# Patient Record
Sex: MALE | Race: WHITE | NOT HISPANIC OR LATINO | URBAN - NONMETROPOLITAN AREA
[De-identification: names, ages, dates, MRNs, and addresses within clinical notes are randomized per-mention and may not be internally consistent; named-entity substitution may affect disease eponyms.]

---

## 2019-01-03 ENCOUNTER — APPOINTMENT (OUTPATIENT)
Dept: LAB | Facility: HOSPITAL | Age: 64
End: 2019-01-03

## 2019-01-03 ENCOUNTER — TRANSCRIBE ORDERS (OUTPATIENT)
Dept: ADMINISTRATIVE | Facility: HOSPITAL | Age: 64
End: 2019-01-03

## 2019-01-03 ENCOUNTER — HOSPITAL ENCOUNTER (OUTPATIENT)
Dept: GENERAL RADIOLOGY | Facility: HOSPITAL | Age: 64
Discharge: HOME | End: 2019-01-03
Admitting: FAMILY MEDICINE

## 2019-01-03 DIAGNOSIS — R07.9 CHEST PAIN, UNSPECIFIED TYPE: Primary | ICD-10-CM

## 2019-01-03 LAB
CK MB SERPL-CCNC: 0.61 NG/ML (ref 0.2–2.4)
CK SERPL-CCNC: 26 U/L (ref 30–170)
TROPONIN I SERPL-MCNC: <0.012 NG/ML (ref 0–0.03)

## 2019-01-03 PROCEDURE — 71046 X-RAY EXAM CHEST 2 VIEWS: CPT

## 2019-01-03 PROCEDURE — 82550 ASSAY OF CK (CPK): CPT | Performed by: FAMILY MEDICINE

## 2019-01-03 PROCEDURE — 36415 COLL VENOUS BLD VENIPUNCTURE: CPT | Performed by: FAMILY MEDICINE

## 2019-01-03 PROCEDURE — 82553 CREATINE MB FRACTION: CPT | Performed by: FAMILY MEDICINE

## 2019-01-03 PROCEDURE — 84484 ASSAY OF TROPONIN QUANT: CPT | Performed by: FAMILY MEDICINE

## 2019-01-07 ENCOUNTER — APPOINTMENT (OUTPATIENT)
Dept: NUCLEAR MEDICINE | Facility: HOSPITAL | Age: 64
End: 2019-01-07

## 2019-04-16 ENCOUNTER — OFFICE VISIT (OUTPATIENT)
Dept: GASTROENTEROLOGY | Facility: CLINIC | Age: 64
End: 2019-04-16

## 2019-04-16 VITALS
HEART RATE: 62 BPM | HEIGHT: 70 IN | WEIGHT: 252 LBS | RESPIRATION RATE: 16 BRPM | BODY MASS INDEX: 36.08 KG/M2 | TEMPERATURE: 97.4 F | DIASTOLIC BLOOD PRESSURE: 96 MMHG | SYSTOLIC BLOOD PRESSURE: 145 MMHG

## 2019-04-16 DIAGNOSIS — Z12.11 COLON CANCER SCREENING: Primary | ICD-10-CM

## 2019-04-16 PROCEDURE — S0285 CNSLT BEFORE SCREEN COLONOSC: HCPCS | Performed by: NURSE PRACTITIONER

## 2019-04-16 RX ORDER — LEVOTHYROXINE SODIUM 0.15 MG/1
150 TABLET ORAL DAILY
COMMUNITY
End: 2021-08-13

## 2019-04-16 RX ORDER — SODIUM CHLORIDE 9 MG/ML
70 INJECTION, SOLUTION INTRAVENOUS CONTINUOUS PRN
Status: CANCELLED | OUTPATIENT
Start: 2019-05-14

## 2019-04-16 NOTE — PROGRESS NOTES
"Chief Complaint   Patient presents with   • Colon Cancer Screening     History of Present Illness     The patient denies recent change in bowel habits. There is no diarrhea or constipation. There is no history of abdominal pain. There is no history of overt GI bleed (hematemesis melena or hematochezia). The patient denies nausea or vomiting. There is no history of reflux. The patient denies dysphagia or odynophagia. There is no history of recent significant weight loss. There is no history of liver disease in the past. There is no family history of colon cancer. The patient's last colonoscopy was in 2008 and was \"normal\" per patient.    Review of Systems   Constitutional: Negative for appetite change, chills, fatigue, fever and unexpected weight change.   HENT: Negative for mouth sores, nosebleeds and trouble swallowing.    Eyes: Negative for discharge and redness.   Respiratory: Positive for shortness of breath. Negative for apnea and cough.    Cardiovascular: Negative for chest pain, palpitations and leg swelling.   Gastrointestinal: Negative for abdominal distention, abdominal pain, anal bleeding, blood in stool, constipation, diarrhea, nausea and vomiting.   Endocrine: Negative for cold intolerance, heat intolerance and polydipsia.   Genitourinary: Negative for dysuria, hematuria and urgency.   Musculoskeletal: Negative for arthralgias, joint swelling and myalgias.   Skin: Negative for rash.   Allergic/Immunologic: Negative for food allergies and immunocompromised state.   Neurological: Negative for dizziness, seizures, syncope and headaches.   Hematological: Negative for adenopathy. Does not bruise/bleed easily.   Psychiatric/Behavioral: Negative for dysphoric mood. The patient is not nervous/anxious and is not hyperactive.      There is no problem list on file for this patient.    Past Medical History:   Diagnosis Date   • [place]      Past Surgical History:   Procedure Laterality Date   • COLONOSCOPY  2008   • " "HAND SURGERY      due to fracture, pins placed, and later removed   • SKIN LESION EXCISION      face     Family History   Problem Relation Age of Onset   • Liver cancer Brother    • Liver disease Brother    • Colon cancer Neg Hx    • Cirrhosis Neg Hx    • Crohn's disease Neg Hx    • Ulcerative colitis Neg Hx    • Esophageal cancer Neg Hx    • Stomach cancer Neg Hx      Social History     Tobacco Use   • Smoking status: Never Smoker   • Smokeless tobacco: Never Used   Substance Use Topics   • Alcohol use: No     Frequency: Never       Current Outpatient Medications:   •  ASPIRIN 81 PO, Take 1 tablet by mouth Daily., Disp: , Rfl:   •  levothyroxine (LEVOXYL) 150 MCG tablet, Take 150 mcg by mouth Daily., Disp: , Rfl:   •  Multiple Vitamin (MULTI VITAMIN PO), Take 1 tablet by mouth Daily., Disp: , Rfl:   •  Omega-3 Fatty Acids (FISH OIL PO), Take 1 capsule by mouth Daily., Disp: , Rfl:     No Known Allergies    Blood pressure 145/96, pulse 62, temperature 97.4 °F (36.3 °C), resp. rate 16, height 177.8 cm (70\"), weight 114 kg (252 lb).    Physical Exam   Constitutional: He is oriented to person, place, and time. He appears well-developed and well-nourished. No distress.   HENT:   Head: Normocephalic and atraumatic.   Right Ear: Hearing and external ear normal.   Left Ear: Hearing and external ear normal.   Nose: Nose normal.   Mouth/Throat: Oropharynx is clear and moist and mucous membranes are normal. Mucous membranes are not pale, not dry and not cyanotic. No oral lesions. No oropharyngeal exudate.   Eyes: Conjunctivae and EOM are normal. Right eye exhibits no discharge. Left eye exhibits no discharge.   Neck: Trachea normal. Neck supple. No JVD present. No edema present. No thyroid mass and no thyromegaly present.   Cardiovascular: Normal rate, regular rhythm, S2 normal and normal heart sounds. Exam reveals no gallop, no S3 and no friction rub.   No murmur heard.  Pulmonary/Chest: Effort normal and breath sounds " normal. No respiratory distress. He exhibits no tenderness.   Abdominal: Normal appearance and bowel sounds are normal. He exhibits no distension, no ascites and no mass. There is no splenomegaly or hepatomegaly. There is no tenderness. There is no rigidity, no rebound and no guarding. No hernia.     Vascular Status -  His right foot exhibits no edema. His left foot exhibits no edema.  Lymphadenopathy:     He has no cervical adenopathy.        Left: No supraclavicular adenopathy present.   Neurological: He is alert and oriented to person, place, and time. He has normal strength. No cranial nerve deficit or sensory deficit.   Skin: No rash noted. He is not diaphoretic. No cyanosis. No pallor. Nails show no clubbing.   Psychiatric: He has a normal mood and affect.   Nursing note and vitals reviewed.  Stigmata of chronic liver disease:  None.  Asterixis:  None.    Assessment:      ICD-10-CM ICD-9-CM   1. Colon cancer screening Z12.11 V76.51       Plan/  Patient Instructions   1. Colonoscopy: Description of the procedure, risks, benefits, alternatives and options, including nonoperative options, were discussed with the patient in detail. The patient understands and wishes to proceed.     SHINE Allred

## 2019-05-06 PROBLEM — Z12.11 COLON CANCER SCREENING: Status: ACTIVE | Noted: 2019-05-06

## 2019-05-13 NOTE — PAT
Called anesthesia phone.  Spoke with Heladio Sullivan, CRNA R/T pt C/O chest pain, with the most recent episode in Dec 2018, in which he saw Dr. Toribio for evaluation.  Pt states that he had a exercise stress test in January 2019.  Pt states that he has not had CP since that time, and states pain was not R/T his heart. Permission received to obtain records from Dr. Toribio.  Received records back.  Reviewed stress test results and plan of care (per office note on 1/31/19) with EBER.  Heladio stated nothing further is required prior to procedure with Dr. Feliciano tomorrow.

## 2019-05-14 ENCOUNTER — ANESTHESIA EVENT (OUTPATIENT)
Dept: GASTROENTEROLOGY | Facility: HOSPITAL | Age: 64
End: 2019-05-14

## 2019-05-14 ENCOUNTER — ANESTHESIA (OUTPATIENT)
Dept: GASTROENTEROLOGY | Facility: HOSPITAL | Age: 64
End: 2019-05-14

## 2019-05-14 ENCOUNTER — HOSPITAL ENCOUNTER (OUTPATIENT)
Facility: HOSPITAL | Age: 64
Setting detail: HOSPITAL OUTPATIENT SURGERY
Discharge: HOME | End: 2019-05-14
Attending: INTERNAL MEDICINE | Admitting: INTERNAL MEDICINE

## 2019-05-14 VITALS
RESPIRATION RATE: 18 BRPM | SYSTOLIC BLOOD PRESSURE: 126 MMHG | BODY MASS INDEX: 36.08 KG/M2 | HEIGHT: 70 IN | TEMPERATURE: 98.3 F | WEIGHT: 252 LBS | DIASTOLIC BLOOD PRESSURE: 88 MMHG | HEART RATE: 66 BPM | OXYGEN SATURATION: 94 %

## 2019-05-14 DIAGNOSIS — Z12.11 COLON CANCER SCREENING: ICD-10-CM

## 2019-05-14 PROCEDURE — 45380 COLONOSCOPY AND BIOPSY: CPT | Performed by: INTERNAL MEDICINE

## 2019-05-14 PROCEDURE — 25010000002 PROPOFOL 200 MG/20ML EMULSION: Performed by: NURSE ANESTHETIST, CERTIFIED REGISTERED

## 2019-05-14 PROCEDURE — 45385 COLONOSCOPY W/LESION REMOVAL: CPT | Performed by: INTERNAL MEDICINE

## 2019-05-14 DEVICE — CLIPPING DEVICE
Type: IMPLANTABLE DEVICE | Site: ABDOMEN | Status: FUNCTIONAL
Brand: RESOLUTION CLIP

## 2019-05-14 RX ORDER — PROPOFOL 10 MG/ML
INJECTION, EMULSION INTRAVENOUS AS NEEDED
Status: DISCONTINUED | OUTPATIENT
Start: 2019-05-14 | End: 2019-05-14 | Stop reason: SURG

## 2019-05-14 RX ORDER — MAGNESIUM HYDROXIDE 1200 MG/15ML
LIQUID ORAL AS NEEDED
Status: DISCONTINUED | OUTPATIENT
Start: 2019-05-14 | End: 2019-05-14 | Stop reason: HOSPADM

## 2019-05-14 RX ORDER — LIDOCAINE 50 MG/G
OINTMENT TOPICAL AS NEEDED
Status: DISCONTINUED | OUTPATIENT
Start: 2019-05-14 | End: 2019-05-14 | Stop reason: HOSPADM

## 2019-05-14 RX ORDER — KETAMINE HYDROCHLORIDE 50 MG/ML
INJECTION, SOLUTION, CONCENTRATE INTRAMUSCULAR; INTRAVENOUS AS NEEDED
Status: DISCONTINUED | OUTPATIENT
Start: 2019-05-14 | End: 2019-05-14 | Stop reason: SURG

## 2019-05-14 RX ORDER — SODIUM CHLORIDE 0.9 % (FLUSH) 0.9 %
3 SYRINGE (ML) INJECTION AS NEEDED
Status: DISCONTINUED | OUTPATIENT
Start: 2019-05-14 | End: 2019-05-14 | Stop reason: HOSPADM

## 2019-05-14 RX ORDER — SODIUM CHLORIDE 9 MG/ML
70 INJECTION, SOLUTION INTRAVENOUS CONTINUOUS PRN
Status: DISCONTINUED | OUTPATIENT
Start: 2019-05-14 | End: 2019-05-14 | Stop reason: HOSPADM

## 2019-05-14 RX ORDER — SIMETHICONE 20 MG/.3ML
EMULSION ORAL AS NEEDED
Status: DISCONTINUED | OUTPATIENT
Start: 2019-05-14 | End: 2019-05-14 | Stop reason: HOSPADM

## 2019-05-14 RX ADMIN — PROPOFOL 100 MG: 10 INJECTION, EMULSION INTRAVENOUS at 10:23

## 2019-05-14 RX ADMIN — SODIUM CHLORIDE: 9 INJECTION, SOLUTION INTRAVENOUS at 10:02

## 2019-05-14 RX ADMIN — PROPOFOL 100 MG: 10 INJECTION, EMULSION INTRAVENOUS at 10:37

## 2019-05-14 RX ADMIN — KETAMINE HYDROCHLORIDE 25 MG: 50 INJECTION, SOLUTION INTRAMUSCULAR; INTRAVENOUS at 10:14

## 2019-05-14 RX ADMIN — PROPOFOL 100 MG: 10 INJECTION, EMULSION INTRAVENOUS at 10:14

## 2019-05-14 RX ADMIN — SODIUM CHLORIDE 70 ML/HR: 9 INJECTION, SOLUTION INTRAVENOUS at 07:54

## 2019-05-14 NOTE — ANESTHESIA PREPROCEDURE EVALUATION
Anesthesia Evaluation     Patient summary reviewed and Nursing notes reviewed   NPO Solid Status: > 8 hours  NPO Liquid Status: > 8 hours           Airway   Mallampati: I  TM distance: >3 FB  Neck ROM: full  No difficulty expected  Dental - normal exam   (+) poor dentition    Pulmonary - negative pulmonary ROS and normal exam   Cardiovascular - normal exam    (+) hyperlipidemia,       Neuro/Psych- negative ROS  GI/Hepatic/Renal/Endo    (+) obesity,       Musculoskeletal (-) negative ROS    Abdominal  - normal exam    Bowel sounds: normal.   Substance History - negative use     OB/GYN negative ob/gyn ROS         Other                        Anesthesia Plan    ASA 2     MAC     intravenous induction   Anesthetic plan, all risks, benefits, and alternatives have been provided, discussed and informed consent has been obtained with: patient.    Plan discussed with attending.

## 2019-05-14 NOTE — ANESTHESIA POSTPROCEDURE EVALUATION
Patient: Chucho Castro    Procedure Summary     Date:  05/14/19 Room / Location:  [facility] ENDOSCOPY 2 / [facility] ENDOSCOPY    Anesthesia Start:  1008 Anesthesia Stop:  1048    Procedure:  COLONOSCOPY with hot snare polypectomy x 1, resolution clip placement x1 (N/A Anus) Diagnosis:       Colon cancer screening      (Colon cancer screening [Z12.11])    Surgeon:  Alessandro Feliciano MD Provider:  Joshua Waddell CRNA    Anesthesia Type:  MAC ASA Status:  2          Anesthesia Type: MAC  Last vitals  BP   126/88 (05/14/19 1150)   Temp   98.3 °F (36.8 °C) (05/14/19 1056)   Pulse   66 (05/14/19 1150)   Resp   18 (05/14/19 1150)     SpO2   94 % (05/14/19 1150)     Post Anesthesia Care and Evaluation    Patient location during evaluation: bedside  Patient participation: complete - patient participated  Level of consciousness: awake and alert  Pain score: 0  Pain management: satisfactory to patient  Airway patency: patent  Anesthetic complications: No anesthetic complications  PONV Status: none  Cardiovascular status: acceptable and hemodynamically stable  Respiratory status: acceptable  Hydration status: acceptable

## 2019-05-14 NOTE — INTERVAL H&P NOTE
"  H&P reviewed. The patient was examined and there are no changes to the H&P.       No recent shortness of breath or chest pains.      Blood pressure 145/82, pulse 88, temperature 97.8 °F (36.6 °C), temperature source Temporal, resp. rate 16, height 177.8 cm (70\"), weight 114 kg (252 lb), SpO2 93 %.    Chest: clear to auscultation.  Cardiac exam: No S3 no murmurs.   Abdomen: soft bowel sounds present nondistended nontender.        "

## 2019-05-14 NOTE — OP NOTE
PROCEDURE:  Colonoscopy to the terminal ileum with one hot snare and 2 cold biopsy polypectomies as well as placement of a resolution clip to coapt the margins.    DATE OF PROCEDURE:  May 14, 2019    REFERRING PROVIDER:  Tamera Joy MD     INSTRUMENT USED:  Olympus PCF H180 AL videocolonoscope.       INDICATIONS OF THE PROCEDURE: This is a 64-year-old white male for colon cancer screening.    PREVIOUS COLONOSCOPY: 2008.    BIOPSIES: Hot snare polypectomy was performed in the proximal descending colon.  A cold biopsy polypectomy was performed in the ascending colon and one in the rectum.      PHOTOGRAPHS:  Photographs were included in the medical records.     MEDICATIONS:  MAC.       CONSENT/PREPROCEDURE EVALUATION:  Risks, benefits, alternatives and options of the procedure including risks of sedation/anesthesia were discussed with the patient and informed consent was obtained prior to the procedure.  History and physical examination were performed and nothing precluded the test.      REPORT:  The patient was placed in left lateral decubitus position and a digital examination was performed.  Once under the influence of IV sedation, the instrument was inserted into the rectum and advanced under direct vision to cecum which was identified by the ileocecal valve, triradiate folds and appendiceal orifice. The scope was then maneuvered into the terminal ileum.        FINDINGS:      Digital rectal examination:  Good anal tone.  No perianal pathology.  No mass.        Terminal ileum:  7-8 cm.  Normal.     Cecum and ascending colon: Scant early diverticular change was noted in the right colon.  3 mm sessile polyp in the ascending colon was removed with cold biopsy forceps.     Hepatic flexure, transverse colon, splenic flexure:  Scant diverticulosis.     Descending colon, sigmoid colon and rectum: Diverticulosis.  An 8 mm flat sessile polyp in the proximal descending colon was removed with hot snare.  Margins were  coapted using a resolution clip.  A 3 mm sessile polyp in the rectum was removed with cold biopsy forceps.  A retroflex examination within the rectum revealed internal hemorrhoids.        The scope was then straightened, the lower GI tract was decompressed, and the scope was pulled out of the patient.  The patient tolerated the procedure well.  There were no immediate complications and the patient was transferred in stable condition for post procedure observation.      TECHNICAL DATA:   1. [place] prep score: 8 (3+2+3).    2. Anus to cecal time: 5  minutes.  3. Difficulty of examination:  Average.  The colon was noted to be tortuous and redundant.  4. Withdrawal time: 18 minutes.  5. Procedure time: 30  minutes  6. Retroflex examination in right colon: Yes.    7. Second look Rectum to cecum with decompression: Yes.    DIAGNOSES:    1. Pandiverticulosis more pronounced in the left colon as compared to the transverse colon no right colon.  2. Colon polyps.  3 were removed.  3-8 mm in size.  3. Internal hemorrhoids.    RECOMMENDATIONS:     1. Dietary instructions.  2. Follow biopsies.    3. Follow-up: The patient may call the office in 1 week regarding biopsy results.  However, the patient may follow-up in the office in 4 weeks if desired.  4. Followup colonoscopy:  5 years.          Thank you very much for letting me participate in the care of this patient. Please do not hesitate to call me if you have any questions.

## 2019-05-14 NOTE — DISCHARGE INSTRUCTIONS
Rest today  No pushing,pulling,tugging,heavy lifting, or strenuous activity   No major decision making,driving,or drinking alcoholic beverages for 24 hours due to the sedation you received  Always use good hand hygiene/washing technique  No driving on pain medication.      To assist you in voiding:  Drink plenty of fluids  Listen to running water while attempting to void.    If you are unable to urinate and you have an uncomfortable urge to void or it has been   6 hours since you were discharged, return to the Emergency Room.        ************************************************************************************    Postprocedure instructions:    Nothing by mouth to fully alert.  Once fully alert may have clear liquid diet.  Advance diet as tolerated.  Vital signs as routine.    Diet:     Low-fat diet.    Blood Thinner Directions:    Avoid Aspirin & other NSAIDS for _7__ days. Tylenol is okay.      Other Instructions:    Call [facility] at [phone] or come to the Emergency Department if you experience the following: Chest pain, abdominal pain, bleeding (vomiting of blood or coffee colored material, black stools or jeff blood in stools), fever/chills, nausea and vomiting or dizziness.      Follow-up:  The patient may call the office in 1 week regarding biopsy results.  However, the patient may follow-up in the office in 4 weeks if desired. DR. DARIN FELICIANO. Office phone # [phone].    Follow-up colonoscopy: in 5 years.    ************************************************************************************    Notes to the patient and the family from Dr. Feliciano.    Dear patient/family member,    Today your colon was examined extensively from rectum to cecum and beyond into the small intestine twice.     Findings on today's procedure are as follows:    1. Diverticulosis. These are benign outpouchings in the colon.   2. Colon polyp. 3 were found and removed.   3. No cancer. No inflammation or  colitis. No suggestion of Crohn's disease.  4. Internal hemorrhoids.     Recommendations:    1. As above.    Should you have more questions please do not hesitate to talk to the nurse who can call me and let me talk to you.      I hope you feel better.    Alessandro Feliciano M.D., FACP, FACG.

## 2019-05-18 LAB
LAB AP CASE REPORT: NORMAL
PATH REPORT.FINAL DX SPEC: NORMAL

## 2021-08-13 ENCOUNTER — OFFICE VISIT (OUTPATIENT)
Dept: CARDIOLOGY | Facility: CLINIC | Age: 66
End: 2021-08-13

## 2021-08-13 VITALS
BODY MASS INDEX: 35.65 KG/M2 | WEIGHT: 249 LBS | OXYGEN SATURATION: 94 % | HEIGHT: 70 IN | HEART RATE: 69 BPM | DIASTOLIC BLOOD PRESSURE: 80 MMHG | SYSTOLIC BLOOD PRESSURE: 120 MMHG

## 2021-08-13 DIAGNOSIS — R06.09 DOE (DYSPNEA ON EXERTION): ICD-10-CM

## 2021-08-13 DIAGNOSIS — R07.2 PRECORDIAL PAIN: Primary | ICD-10-CM

## 2021-08-13 PROCEDURE — 93000 ELECTROCARDIOGRAM COMPLETE: CPT | Performed by: INTERNAL MEDICINE

## 2021-08-13 PROCEDURE — 99204 OFFICE O/P NEW MOD 45 MIN: CPT | Performed by: INTERNAL MEDICINE

## 2021-08-13 RX ORDER — LEVOTHYROXINE SODIUM 175 UG/1
175 TABLET ORAL DAILY
COMMUNITY
Start: 2021-07-29

## 2021-08-13 NOTE — PROGRESS NOTES
Subjective:     Encounter Date:08/13/2021      Patient ID: Chucho Castro is a 66 y.o. male.    Chief Complaint: Throat pain  HPI  This is a 66-year-old male patient with no prior history of documented coronary disease who presents to cardiology clinic to evaluate bilateral throat discomfort.  The patient describes a squeezing sensation in his throat which began approximately 1/2 months ago.  The episodes typically occur with exertion and will last approximately 10 to 20 minutes per episode.  It typically has a 5/10 intensity and is associated with nausea shortness of breath and diaphoresis.  The symptoms typically occur when working and doing other exertional activities and tend to be relieved with rest.  The patient has a longstanding history of atypical chest pain.  He underwent a treadmill exercise stress test 2 years ago by Dr. Toribio.  This was reported to be normal.  He has had a previous nuclear stress test which was normal several years ago.  Patient has a strong family history of premature coronary artery disease.  He has no personal history of hypertension diabetes or dyslipidemia.  He is a non-smoker.  The patient indicates that up until recently he was drinking 2 L of diet soda per day.  He indicates he has stopped drinking the soda and his symptoms have improved.  The patient also reports having shortness of breath with exertional activities.  This is typically relieved with rest and will generally last around 10 minutes per episode.  It tends to be associated with his throat tightness but does occasionally occur in isolation.  He has had no orthopnea PND or lower extremity edema.  He has no dizziness palpitations or syncope.  The following portions of the patient's history were reviewed and updated as appropriate: allergies, current medications, past family history, past medical history, past social history, past surgical history and problem  Review of Systems   Constitutional: Negative for  chills, diaphoresis, fever, malaise/fatigue, weight gain and weight loss.   HENT: Negative for ear discharge, hearing loss, hoarse voice and nosebleeds.    Eyes: Negative for discharge, double vision, pain and photophobia.   Cardiovascular: Positive for chest pain and dyspnea on exertion. Negative for claudication, cyanosis, irregular heartbeat, leg swelling, near-syncope, orthopnea, palpitations, paroxysmal nocturnal dyspnea and syncope.   Respiratory: Positive for shortness of breath. Negative for cough, hemoptysis, sputum production and wheezing.    Endocrine: Negative for cold intolerance, heat intolerance, polydipsia, polyphagia and polyuria.   Hematologic/Lymphatic: Negative for adenopathy and bleeding problem. Does not bruise/bleed easily.   Skin: Negative for color change, flushing, itching and rash.   Musculoskeletal: Negative for muscle cramps, muscle weakness, myalgias and stiffness.   Gastrointestinal: Negative for abdominal pain, diarrhea, hematemesis, hematochezia, nausea and vomiting.   Genitourinary: Negative for dysuria, frequency and nocturia.   Neurological: Negative for focal weakness, loss of balance, numbness, paresthesias and seizures.   Psychiatric/Behavioral: Negative for altered mental status, hallucinations and suicidal ideas.   Allergic/Immunologic: Negative for HIV exposure, hives and persistent infections.           Current Outpatient Medications:   •  ASPIRIN 81 PO, Take 1 tablet by mouth Daily., Disp: , Rfl:   •  levothyroxine (SYNTHROID, LEVOTHROID) 175 MCG tablet, Take 175 mcg by mouth Daily., Disp: , Rfl:   •  Multiple Vitamin (MULTI VITAMIN PO), Take 1 tablet by mouth Daily., Disp: , Rfl:   •  Omega-3 Fatty Acids (FISH OIL PO), Take 1 capsule by mouth Daily., Disp: , Rfl:     Objective:   Vitals and nursing note reviewed.   Constitutional:       Appearance: Healthy appearance. Not in distress.   Neck:      Vascular: No JVR. JVD normal.   Pulmonary:      Effort: Pulmonary effort is  "normal.      Breath sounds: Normal breath sounds. No wheezing. No rhonchi. No rales.   Chest:      Chest wall: Not tender to palpatation.   Cardiovascular:      PMI at left midclavicular line. Normal rate. Regular rhythm. Normal S1. Normal S2.      Murmurs: There is no murmur.      No gallop. No click. No rub.   Pulses:     Intact distal pulses.   Edema:     Peripheral edema absent.   Abdominal:      General: Bowel sounds are normal.      Palpations: Abdomen is soft.      Tenderness: There is no abdominal tenderness.   Musculoskeletal: Normal range of motion.         General: No tenderness. Skin:     General: Skin is warm and dry.   Neurological:      General: No focal deficit present.      Mental Status: Alert and oriented to person, place and time.       Blood pressure 120/80, pulse 69, height 177.8 cm (70\"), weight 113 kg (249 lb), SpO2 94 %.   Lab Review:     Assessment:       1. Precordial pain  Atypical chest pain.  Its been 2 years since his last ischemic evaluation.  Multiple risk factors for coronary artery disease.    2. [facility] (dyspnea on exertion)  Possible angina equivalent.      ECG 12 Lead    Date/Time: 8/13/2021 12:12 PM  Performed by: Jeffrey Lopez MD  Authorized by: Jeffrey Lopez MD   Comparison: not compared with previous ECG   Rhythm: sinus rhythm  Rate: normal  QRS axis: normal  Other findings: T wave abnormality    Clinical impression: abnormal EKG            Plan:     I have recommended a treadmill exercise stress test and an echocardiogram.  No changes to his medications have been made at today's visit.  Further recommendations will be predicated on the results of his outpatient testing.  Advance Care Planning   ACP discussion was held with the patient during this visit. Patient has an advance directive (not in EMR), copy requested.        "

## 2021-09-01 LAB
BH CV ECHO MEAS - % IVS THICK: 25 %
BH CV ECHO MEAS - % LVPW THICK: 34.8 %
BH CV ECHO MEAS - AO MAX PG (FULL): -0.4 MMHG
BH CV ECHO MEAS - AO MAX PG: 6 MMHG
BH CV ECHO MEAS - AO MEAN PG (FULL): 1 MMHG
BH CV ECHO MEAS - AO MEAN PG: 3 MMHG
BH CV ECHO MEAS - AO ROOT AREA (BSA CORRECTED): 0.83
BH CV ECHO MEAS - AO ROOT AREA: 2.8 CM^2
BH CV ECHO MEAS - AO ROOT DIAM: 1.9 CM
BH CV ECHO MEAS - AO V2 MAX: 124 CM/SEC
BH CV ECHO MEAS - AO V2 MEAN: 81.6 CM/SEC
BH CV ECHO MEAS - AO V2 VTI: 23.4 CM
BH CV ECHO MEAS - AVA(I,A): 3.5 CM^2
BH CV ECHO MEAS - AVA(I,D): 3.5 CM^2
BH CV ECHO MEAS - AVA(V,A): 3.5 CM^2
BH CV ECHO MEAS - AVA(V,D): 3.5 CM^2
BH CV ECHO MEAS - BSA(HAYCOCK): 2.4 M^2
BH CV ECHO MEAS - BSA: 2.3 M^2
BH CV ECHO MEAS - BZI_BMI: 35.7 KILOGRAMS/M^2
BH CV ECHO MEAS - BZI_METRIC_HEIGHT: 177.8 CM
BH CV ECHO MEAS - BZI_METRIC_WEIGHT: 112.9 KG
BH CV ECHO MEAS - EDV(CUBED): 153.1 ML
BH CV ECHO MEAS - EDV(MOD-SP4): 133 ML
BH CV ECHO MEAS - EDV(TEICH): 138.3 ML
BH CV ECHO MEAS - EF(CUBED): 74.3 %
BH CV ECHO MEAS - EF(MOD-SP4): 61.7 %
BH CV ECHO MEAS - EF(TEICH): 65.7 %
BH CV ECHO MEAS - ESV(CUBED): 39.3 ML
BH CV ECHO MEAS - ESV(MOD-SP4): 51 ML
BH CV ECHO MEAS - ESV(TEICH): 47.4 ML
BH CV ECHO MEAS - FS: 36.4 %
BH CV ECHO MEAS - IVS/LVPW: 1.2
BH CV ECHO MEAS - IVSD: 1.4 CM
BH CV ECHO MEAS - IVSS: 1.8 CM
BH CV ECHO MEAS - LA DIMENSION: 3.9 CM
BH CV ECHO MEAS - LA/AO: 2.1
BH CV ECHO MEAS - LAD MAJOR: 5 CM
BH CV ECHO MEAS - LAT PEAK E' VEL: 12.9 CM/SEC
BH CV ECHO MEAS - LATERAL E/E' RATIO: 4.5
BH CV ECHO MEAS - LV DIASTOLIC VOL/BSA (35-75): 58.1 ML/M^2
BH CV ECHO MEAS - LV MASS(C)D: 283.4 GRAMS
BH CV ECHO MEAS - LV MASS(C)DI: 123.7 GRAMS/M^2
BH CV ECHO MEAS - LV MASS(C)S: 218.1 GRAMS
BH CV ECHO MEAS - LV MASS(C)SI: 95.2 GRAMS/M^2
BH CV ECHO MEAS - LV MAX PG: 6.4 MMHG
BH CV ECHO MEAS - LV MEAN PG: 2 MMHG
BH CV ECHO MEAS - LV SYSTOLIC VOL/BSA (12-30): 22.3 ML/M^2
BH CV ECHO MEAS - LV V1 MAX: 126.5 CM/SEC
BH CV ECHO MEAS - LV V1 MEAN: 66.6 CM/SEC
BH CV ECHO MEAS - LV V1 VTI: 23.9 CM
BH CV ECHO MEAS - LVIDD: 5.4 CM
BH CV ECHO MEAS - LVIDS: 3.4 CM
BH CV ECHO MEAS - LVLD AP4: 10 CM
BH CV ECHO MEAS - LVLS AP4: 8.1 CM
BH CV ECHO MEAS - LVOT AREA (M): 3.5 CM^2
BH CV ECHO MEAS - LVOT AREA: 3.5 CM^2
BH CV ECHO MEAS - LVOT DIAM: 2.1 CM
BH CV ECHO MEAS - LVPWD: 1.2 CM
BH CV ECHO MEAS - LVPWS: 1.6 CM
BH CV ECHO MEAS - MED PEAK E' VEL: 8.5 CM/SEC
BH CV ECHO MEAS - MEDIAL E/E' RATIO: 6.9
BH CV ECHO MEAS - MV A MAX VEL: 50.9 CM/SEC
BH CV ECHO MEAS - MV DEC SLOPE: 197.5 CM/SEC^2
BH CV ECHO MEAS - MV DEC TIME: 0.28 SEC
BH CV ECHO MEAS - MV E MAX VEL: 58.4 CM/SEC
BH CV ECHO MEAS - MV E/A: 1.1
BH CV ECHO MEAS - MV MAX PG: 1.9 MMHG
BH CV ECHO MEAS - MV MEAN PG: 1 MMHG
BH CV ECHO MEAS - MV P1/2T MAX VEL: 59.8 CM/SEC
BH CV ECHO MEAS - MV P1/2T: 88.7 MSEC
BH CV ECHO MEAS - MV V2 MAX: 68.9 CM/SEC
BH CV ECHO MEAS - MV V2 MEAN: 40 CM/SEC
BH CV ECHO MEAS - MV V2 VTI: 19.5 CM
BH CV ECHO MEAS - MVA P1/2T LCG: 3.7 CM^2
BH CV ECHO MEAS - MVA(P1/2T): 2.5 CM^2
BH CV ECHO MEAS - MVA(VTI): 4.2 CM^2
BH CV ECHO MEAS - PA MAX PG: 2.2 MMHG
BH CV ECHO MEAS - PA V2 MAX: 74.3 CM/SEC
BH CV ECHO MEAS - RAP SYSTOLE: 3 MMHG
BH CV ECHO MEAS - RVSP: 15 MMHG
BH CV ECHO MEAS - SI(AO): 29 ML/M^2
BH CV ECHO MEAS - SI(CUBED): 49.7 ML/M^2
BH CV ECHO MEAS - SI(LVOT): 36.1 ML/M^2
BH CV ECHO MEAS - SI(MOD-SP4): 35.8 ML/M^2
BH CV ECHO MEAS - SI(TEICH): 39.7 ML/M^2
BH CV ECHO MEAS - SV(AO): 66.3 ML
BH CV ECHO MEAS - SV(CUBED): 113.8 ML
BH CV ECHO MEAS - SV(LVOT): 82.8 ML
BH CV ECHO MEAS - SV(MOD-SP4): 82 ML
BH CV ECHO MEAS - SV(TEICH): 90.9 ML
BH CV ECHO MEAS - TR MAX PG: 12 MMHG
BH CV ECHO MEAS - TR MAX VEL: 171.5 CM/SEC
BH CV ECHO MEAS - TV MAX PG: 1.5 MMHG
BH CV ECHO MEAS - TV V2 MAX: 60.5 CM/SEC
BH CV ECHO MEASUREMENTS AVERAGE E/E' RATIO: 5.46
LEFT ATRIUM VOLUME INDEX: 17 ML/M^2
LEFT ATRIUM VOLUME: 39 ML
LV EF 2D ECHO EST: 65 %

## 2021-09-03 LAB
BH CV STRESS BP STAGE 1: NORMAL
BH CV STRESS BP STAGE 2: NORMAL
BH CV STRESS DURATION MIN STAGE 1: 3
BH CV STRESS DURATION MIN STAGE 2: 3
BH CV STRESS DURATION MIN STAGE 3: 3
BH CV STRESS DURATION SEC STAGE 1: 0
BH CV STRESS DURATION SEC STAGE 2: 0
BH CV STRESS DURATION SEC STAGE 3: 0
BH CV STRESS GRADE STAGE 1: 10
BH CV STRESS GRADE STAGE 2: 12
BH CV STRESS GRADE STAGE 3: 14
BH CV STRESS HR STAGE 1: 97
BH CV STRESS HR STAGE 2: 124
BH CV STRESS HR STAGE 3: 134
BH CV STRESS METS STAGE 1: 5
BH CV STRESS METS STAGE 2: 7.5
BH CV STRESS METS STAGE 3: 10
BH CV STRESS O2 STAGE 1: 93
BH CV STRESS O2 STAGE 2: 93
BH CV STRESS O2 STAGE 3: 96
BH CV STRESS PROTOCOL 1: NORMAL
BH CV STRESS RECOVERY BP: NORMAL MMHG
BH CV STRESS RECOVERY HR: 71 BPM
BH CV STRESS RECOVERY O2: 96 %
BH CV STRESS SPEED STAGE 1: 1.7
BH CV STRESS SPEED STAGE 2: 2.5
BH CV STRESS SPEED STAGE 3: 3.4
BH CV STRESS STAGE 1: 1
BH CV STRESS STAGE 2: 2
BH CV STRESS STAGE 3: 3
MAXIMAL PREDICTED HEART RATE: 155 BPM
PERCENT MAX PREDICTED HR: 87.1 %
STRESS BASELINE BP: NORMAL MMHG
STRESS BASELINE HR: 72 BPM
STRESS O2 SAT REST: 97 %
STRESS PERCENT HR: 102 %
STRESS POST ESTIMATED WORKLOAD: 10.1 METS
STRESS POST EXERCISE DUR MIN: 7 MIN
STRESS POST O2 SAT PEAK: 93 %
STRESS POST PEAK BP: NORMAL MMHG
STRESS POST PEAK HR: 135 BPM
STRESS TARGET HR: 132 BPM

## 2021-09-13 ENCOUNTER — OFFICE VISIT (OUTPATIENT)
Dept: CARDIOLOGY | Facility: CLINIC | Age: 66
End: 2021-09-13

## 2021-09-13 VITALS
HEIGHT: 70 IN | WEIGHT: 248 LBS | OXYGEN SATURATION: 93 % | DIASTOLIC BLOOD PRESSURE: 80 MMHG | SYSTOLIC BLOOD PRESSURE: 120 MMHG | BODY MASS INDEX: 35.5 KG/M2 | HEART RATE: 71 BPM

## 2021-09-13 DIAGNOSIS — R07.2 PRECORDIAL PAIN: Primary | ICD-10-CM

## 2021-09-13 DIAGNOSIS — R06.09 DOE (DYSPNEA ON EXERTION): ICD-10-CM

## 2021-09-13 PROCEDURE — 99212 OFFICE O/P EST SF 10 MIN: CPT | Performed by: INTERNAL MEDICINE

## 2021-09-13 NOTE — PROGRESS NOTES
"    Subjective:     Encounter Date:09/13/2021      Patient ID: Chucho Castro is a 66 y.o. male.    Chief Complaint: Chest pain  HPI  This is a 66-year-old male patient who underwent a battery of outpatient testing for chest discomfort and shortness of breath.  The patient had a treadmill exercise stress test which was clinically and electrocardiographically negative for ischemia.  The patient exercised to target heart rate with good effort tolerance with no chest discomfort, shortness of breath or ischemic EKG changes.  Heart rate and blood pressure response to exercise was normal.  There was no exercise-induced arrhythmia.  The patient also had a normal echocardiogram. The echocardiogram showed no evidence of ventricular hypertrophy or cardiac chamber enlargement.  Left ventricular systolic and diastolic function was normal.  There was no evidence of valvular pathology of significance, pericardial disease, pulmonary hypertension or intracardiac shunting.  The left ventricular ejection fraction was normal and there were no regional wall motion abnormalities.  The patient indicates his chest discomfort has improved spontaneously after the patient began \"cutting back\" on his caffeine intake.  The following portions of the patient's history were reviewed and updated as appropriate: allergies, current medications, past family history, past medical history, past social history, past surgical history and problem  Review of Systems   Constitutional: Negative for chills, diaphoresis, fever, malaise/fatigue, weight gain and weight loss.   HENT: Negative for ear discharge, hearing loss, hoarse voice and nosebleeds.    Eyes: Negative for discharge, double vision, pain and photophobia.   Cardiovascular: Negative for chest pain, claudication, cyanosis, dyspnea on exertion, irregular heartbeat, leg swelling, near-syncope, orthopnea, palpitations, paroxysmal nocturnal dyspnea and syncope.   Respiratory: Negative for cough, " hemoptysis, shortness of breath, sputum production and wheezing.    Endocrine: Negative for cold intolerance, heat intolerance, polydipsia, polyphagia and polyuria.   Hematologic/Lymphatic: Negative for adenopathy and bleeding problem. Does not bruise/bleed easily.   Skin: Negative for color change, flushing, itching and rash.   Musculoskeletal: Negative for muscle cramps, muscle weakness, myalgias and stiffness.   Gastrointestinal: Negative for abdominal pain, diarrhea, hematemesis, hematochezia, nausea and vomiting.   Genitourinary: Negative for dysuria, frequency and nocturia.   Neurological: Negative for focal weakness, loss of balance, numbness, paresthesias and seizures.   Psychiatric/Behavioral: Negative for altered mental status, hallucinations and suicidal ideas.   Allergic/Immunologic: Negative for HIV exposure, hives and persistent infections.           Current Outpatient Medications:   •  ASPIRIN 81 PO, Take 1 tablet by mouth Daily., Disp: , Rfl:   •  cyanocobalamin (VITAMIN B-12) 1000 MCG tablet, cyanocobalamin (vit B-12) 1,000 mcg tablet  Take 1 tablet every day by oral route., Disp: , Rfl:   •  levothyroxine (SYNTHROID, LEVOTHROID) 175 MCG tablet, Take 175 mcg by mouth Daily., Disp: , Rfl:   •  Multiple Vitamin (MULTI VITAMIN PO), Take 1 tablet by mouth Daily., Disp: , Rfl:   •  Omega-3 Fatty Acids (FISH OIL PO), Take 1 capsule by mouth Daily., Disp: , Rfl:     Objective:   Vitals and nursing note reviewed.   Constitutional:       Appearance: Healthy appearance. Not in distress.   Neck:      Vascular: No JVR. JVD normal.   Pulmonary:      Effort: Pulmonary effort is normal.      Breath sounds: Normal breath sounds. No wheezing. No rhonchi. No rales.   Chest:      Chest wall: Not tender to palpatation.   Cardiovascular:      PMI at left midclavicular line. Normal rate. Regular rhythm. Normal S1. Normal S2.      Murmurs: There is no murmur.      No gallop. No click. No rub.   Pulses:     Intact distal  "pulses.   Edema:     Peripheral edema absent.   Abdominal:      General: Bowel sounds are normal.      Palpations: Abdomen is soft.      Tenderness: There is no abdominal tenderness.   Musculoskeletal: Normal range of motion.         General: No tenderness. Skin:     General: Skin is warm and dry.   Neurological:      General: No focal deficit present.      Mental Status: Alert and oriented to person, place and time.       Blood pressure 120/80, pulse 71, height 177.8 cm (70\"), weight 112 kg (248 lb), SpO2 93 %.   Lab Review:     Assessment:       1. Precordial pain  Noncardiac chest pain.  No evidence of ischemic heart disease.  Structurally normal heart.    2. KAISER (dyspnea on exertion)  Noncardiac etiology.    Procedures    Plan:     The patient has been reassured regarding the benign nature of his cardiac test results.  He has been advised to follow-up with his primary care provider to discuss the option of outpatient sleep study.  No changes to his medications have been made at today's visit.  No further cardiovascular testing is warranted.      "

## 2021-11-15 ENCOUNTER — HOSPITAL ENCOUNTER (OUTPATIENT)
Dept: GENERAL RADIOLOGY | Facility: HOSPITAL | Age: 66
Discharge: HOME | End: 2021-11-15
Admitting: FAMILY MEDICINE

## 2021-11-15 ENCOUNTER — TRANSCRIBE ORDERS (OUTPATIENT)
Dept: GENERAL RADIOLOGY | Facility: HOSPITAL | Age: 66
End: 2021-11-15

## 2021-11-15 DIAGNOSIS — J40 BRONCHITIS, NOT SPECIFIED AS ACUTE OR CHRONIC: Primary | ICD-10-CM

## 2021-11-15 DIAGNOSIS — J40 BRONCHITIS, NOT SPECIFIED AS ACUTE OR CHRONIC: ICD-10-CM

## 2021-11-15 PROCEDURE — 71046 X-RAY EXAM CHEST 2 VIEWS: CPT

## 2021-11-22 ENCOUNTER — TRANSCRIBE ORDERS (OUTPATIENT)
Dept: GENERAL RADIOLOGY | Facility: HOSPITAL | Age: 66
End: 2021-11-22

## 2021-11-22 DIAGNOSIS — J18.9 PNEUMONIA DUE TO INFECTIOUS ORGANISM, UNSPECIFIED LATERALITY, UNSPECIFIED PART OF LUNG: Primary | ICD-10-CM

## 2021-11-24 ENCOUNTER — HOSPITAL ENCOUNTER (OUTPATIENT)
Dept: GENERAL RADIOLOGY | Facility: HOSPITAL | Age: 66
Discharge: HOME | End: 2021-11-24
Admitting: FAMILY MEDICINE

## 2021-11-24 DIAGNOSIS — J18.9 PNEUMONIA DUE TO INFECTIOUS ORGANISM, UNSPECIFIED LATERALITY, UNSPECIFIED PART OF LUNG: ICD-10-CM

## 2021-11-24 PROCEDURE — 71046 X-RAY EXAM CHEST 2 VIEWS: CPT

## 2022-03-01 ENCOUNTER — TRANSCRIBE ORDERS (OUTPATIENT)
Dept: ADMINISTRATIVE | Facility: HOSPITAL | Age: 67
End: 2022-03-01

## 2022-03-01 DIAGNOSIS — R06.00 DYSPNEA, UNSPECIFIED TYPE: Primary | ICD-10-CM

## 2022-03-08 ENCOUNTER — APPOINTMENT (OUTPATIENT)
Dept: LAB | Facility: HOSPITAL | Age: 67
End: 2022-03-08

## 2022-03-11 ENCOUNTER — HOSPITAL ENCOUNTER (OUTPATIENT)
Dept: PULMONOLOGY | Facility: HOSPITAL | Age: 67
Discharge: HOME | End: 2022-03-11
Admitting: FAMILY MEDICINE

## 2022-03-11 DIAGNOSIS — R06.00 DYSPNEA, UNSPECIFIED TYPE: ICD-10-CM

## 2022-03-11 PROCEDURE — 94726 PLETHYSMOGRAPHY LUNG VOLUMES: CPT

## 2022-03-11 PROCEDURE — 94010 BREATHING CAPACITY TEST: CPT

## 2022-03-11 PROCEDURE — 94729 DIFFUSING CAPACITY: CPT

## 2022-03-11 PROCEDURE — 94010 BREATHING CAPACITY TEST: CPT | Performed by: INTERNAL MEDICINE

## 2022-03-11 PROCEDURE — 94726 PLETHYSMOGRAPHY LUNG VOLUMES: CPT | Performed by: INTERNAL MEDICINE

## 2022-03-11 PROCEDURE — 94729 DIFFUSING CAPACITY: CPT | Performed by: INTERNAL MEDICINE

## 2024-04-26 ENCOUNTER — OFFICE VISIT (OUTPATIENT)
Dept: GASTROENTEROLOGY | Facility: CLINIC | Age: 69
End: 2024-04-26
Payer: COMMERCIAL

## 2024-04-26 VITALS
DIASTOLIC BLOOD PRESSURE: 80 MMHG | WEIGHT: 241 LBS | SYSTOLIC BLOOD PRESSURE: 118 MMHG | HEART RATE: 67 BPM | OXYGEN SATURATION: 97 % | BODY MASS INDEX: 34.58 KG/M2

## 2024-04-26 DIAGNOSIS — Z80.0 FAMILY HISTORY OF COLON CANCER: ICD-10-CM

## 2024-04-26 DIAGNOSIS — Z86.010 HISTORY OF COLON POLYPS: Primary | ICD-10-CM

## 2024-04-26 RX ORDER — DORZOLAMIDE HCL 20 MG/ML
1 SOLUTION/ DROPS OPHTHALMIC 3 TIMES DAILY
COMMUNITY

## 2024-04-26 RX ORDER — SODIUM, POTASSIUM,MAG SULFATES 17.5-3.13G
1 SOLUTION, RECONSTITUTED, ORAL ORAL EVERY 12 HOURS
Qty: 354 ML | Refills: 0 | Status: SHIPPED | OUTPATIENT
Start: 2024-04-26 | End: 2024-04-27

## 2024-04-26 RX ORDER — LATANOPROST 50 UG/ML
1 SOLUTION/ DROPS OPHTHALMIC NIGHTLY
COMMUNITY

## 2024-04-26 NOTE — H&P (VIEW-ONLY)
"     New Patient Consult      Date: 2024   Patient Name: Chucho Castro  MRN: [id number]  : 1955     Referring Physician: Tamera Joy MD    Chief Complaint   Patient presents with    Colon Cancer Screening       History of Present Illness: Chucho Castro is a 69 y.o. male who is here today to establish care with Gastroenterology.    Had a colonoscopy in 2019. Had a TA on that exam.    Subjective      Past Medical History:   Diagnosis Date    Chest pain     12 years ago.  Dec 2018 states had \"pinching\" in chest.  states was evaluated at primary care and saw Dr. Toribio.  Pt states was not heart related issue.     Disease of thyroid gland     Elevated cholesterol     History of nuclear stress test     \"about 12 years ago\"    History of transesophageal echocardiography ([facility])     \"about 12 years ago when I had the stress test\"    Hx of exercise stress test 2019    Dr. Catarino Mckeon        Past Surgical History:   Procedure Laterality Date    BONE GRAFT Right 2021    Jaw    COLONOSCOPY      COLONOSCOPY N/A 2019    Procedure: COLONOSCOPY with hot snare polypectomy x 1, cold biopsy polypectom x 2, resolution clip placement x1;  Surgeon: Alessandro Feliciano MD;  Location: [facility] ENDOSCOPY;  Service: Gastroenterology    HAND SURGERY Right     due to fracture, pins placed, and later removed    SKIN LESION EXCISION      face    VASECTOMY         Family History   Problem Relation Age of Onset    Liver cancer Brother     Liver disease Brother     Colon cancer Neg Hx     Cirrhosis Neg Hx     Crohn's disease Neg Hx     Ulcerative colitis Neg Hx     Esophageal cancer Neg Hx     Stomach cancer Neg Hx        Social History     Socioeconomic History    Marital status:    Tobacco Use    Smoking status: Never    Smokeless tobacco: Never   Substance and Sexual Activity    Alcohol use: No     Comment: no use x 25 years.      Drug use: No    Sexual activity: Defer         Current Outpatient " Medications:     ASPIRIN 81 PO, Take 1 tablet by mouth Daily., Disp: , Rfl:     cyanocobalamin (VITAMIN B-12) 1000 MCG tablet, cyanocobalamin (vit B-12) 1,000 mcg tablet  Take 1 tablet every day by oral route., Disp: , Rfl:     dorzolamide (TRUSOPT) 2 % ophthalmic solution, 1 drop 3 (Three) Times a Day., Disp: , Rfl:     latanoprost (XALATAN) 0.005 % ophthalmic solution, 1 drop Every Night., Disp: , Rfl:     levothyroxine (SYNTHROID, LEVOTHROID) 175 MCG tablet, Take 1 tablet by mouth Daily., Disp: , Rfl:     Multiple Vitamin (MULTI VITAMIN PO), Take 1 tablet by mouth Daily., Disp: , Rfl:     Omega-3 Fatty Acids (FISH OIL PO), Take 1 capsule by mouth Daily., Disp: , Rfl:     No Known Allergies    Review of Systems   Constitutional:  Negative for unexpected weight loss.   HENT:  Negative for trouble swallowing.    Gastrointestinal:  Negative for abdominal distention, abdominal pain, anal bleeding, blood in stool, constipation, diarrhea, nausea, rectal pain, vomiting, GERD and indigestion.       The following portions of the patient's history were reviewed and updated as appropriate: allergies, current medications, past family history, past medical history, past social history, past surgical history and problem list.    Objective     Physical Exam:  Vitals:    04/26/24 0902   BP: 118/80   Pulse: 67   SpO2: 97%   Weight: 109 kg (241 lb)       Physical Exam  Constitutional:       General: He is not in acute distress.     Appearance: Normal appearance.   HENT:      Head: Normocephalic and atraumatic.      Nose: Nose normal.      Mouth/Throat:      Mouth: Mucous membranes are moist.   Eyes:      General: No scleral icterus.     Conjunctiva/sclera: Conjunctivae normal.   Cardiovascular:      Rate and Rhythm: Normal rate.   Pulmonary:      Effort: Pulmonary effort is normal. No respiratory distress.   Abdominal:      General: There is no distension.      Tenderness: There is no abdominal tenderness. There is no guarding.    Musculoskeletal:         General: No deformity or signs of injury.      Cervical back: Neck supple. No rigidity.   Skin:     Capillary Refill: Capillary refill takes less than 2 seconds.      Coloration: Skin is not jaundiced or pale.   Neurological:      General: No focal deficit present.      Mental Status: He is alert and oriented to person, place, and time.   Psychiatric:         Mood and Affect: Mood normal.         Behavior: Behavior normal.         Results Review:   I have reviewed the patient's new clinical and imaging results and agree with the interpretation.     No visits with results within 90 Day(s) from this visit.   Latest known visit with results is:   Office Visit on 08/13/2021   Component Date Value Ref Range Status    BH CV STRESS PROTOCOL 1 08/31/2021 Edwin   Final    Stage 1 08/31/2021 1   Final    HR Stage 1 08/31/2021 97   Final    BP Stage 1 08/31/2021 140/96   Final    O2 Stage 1 08/31/2021 93   Final    Duration Min Stage 1 08/31/2021 3   Final    Duration Sec Stage 1 08/31/2021 0   Final    Grade Stage 1 08/31/2021 10   Final    Speed Stage 1 08/31/2021 1.7   Final    BH CV STRESS METS STAGE 1 08/31/2021 5   Final    Stage 2 08/31/2021 2   Final    HR Stage 2 08/31/2021 124   Final    BP Stage 2 08/31/2021 148/92   Final    O2 Stage 2 08/31/2021 93   Final    Duration Min Stage 2 08/31/2021 3   Final    Duration Sec Stage 2 08/31/2021 0   Final    Grade Stage 2 08/31/2021 12   Final    Speed Stage 2 08/31/2021 2.5   Final    BH CV STRESS METS STAGE 2 08/31/2021 7.5   Final    Stage 3 08/31/2021 3   Final    HR Stage 3 08/31/2021 134   Final    O2 Stage 3 08/31/2021 96   Final    Duration Min Stage 3 08/31/2021 3   Final    Duration Sec Stage 3 08/31/2021 0   Final    Grade Stage 3 08/31/2021 14   Final    Speed Stage 3 08/31/2021 3.4   Final    BH CV STRESS METS STAGE 3 08/31/2021 10.0   Final    Baseline HR 08/31/2021 72  bpm Final    Baseline BP 08/31/2021 146/96  mmHg Final    O2 sat  rest 08/31/2021 97  % Final    Peak HR 08/31/2021 135  bpm Final    Percent Max Pred HR 08/31/2021 87.10  % Final    Percent Target HR 08/31/2021 102  % Final    Peak BP 08/31/2021 148/92  mmHg Final    O2 sat peak 08/31/2021 93  % Final    Recovery HR 08/31/2021 71  bpm Final    Recovery BP 08/31/2021 138/90  mmHg Final    Recovery O2 08/31/2021 96  % Final    Target HR (85%) 08/31/2021 132  bpm Final    Max. Pred. HR (100%) 08/31/2021 155  bpm Final    Exercise duration (min) 08/31/2021 7  min Final    Estimated workload 08/31/2021 10.1  METS Final    BSA 08/31/2021 2.3  m^2 Final    IVSd 08/31/2021 1.4  cm Final    IVSs 08/31/2021 1.8  cm Final    LVIDd 08/31/2021 5.4  cm Final    LVIDs 08/31/2021 3.4  cm Final    LVPWd 08/31/2021 1.2  cm Final    BH CV ECHO SOLA - LVPWS 08/31/2021 1.6  cm Final    IVS/LVPW 08/31/2021 1.2   Final    FS 08/31/2021 36.4  % Final    EDV(Teich) 08/31/2021 138.3  ml Final    ESV(Teich) 08/31/2021 47.4  ml Final    EF(Teich) 08/31/2021 65.7  % Final    EDV(cubed) 08/31/2021 153.1  ml Final    ESV(cubed) 08/31/2021 39.3  ml Final    EF(cubed) 08/31/2021 74.3  % Final    % IVS thick 08/31/2021 25.0  % Final    % LVPW thick 08/31/2021 34.8  % Final    LV mass(C)d 08/31/2021 283.4  grams Final    LV mass(C)dI 08/31/2021 123.7  grams/m^2 Final    LV mass(C)s 08/31/2021 218.1  grams Final    LV mass(C)sI 08/31/2021 95.2  grams/m^2 Final    SV(Teich) 08/31/2021 90.9  ml Final    SI(Teich) 08/31/2021 39.7  ml/m^2 Final    SV(cubed) 08/31/2021 113.8  ml Final    SI(cubed) 08/31/2021 49.7  ml/m^2 Final    Ao root diam 08/31/2021 1.9  cm Final    Ao root area 08/31/2021 2.8  cm^2 Final    LA dimension (2D)  08/31/2021 3.9  cm Final    LA/Ao 08/31/2021 2.1   Final    LVOT diam 08/31/2021 2.1  cm Final    LVOT area 08/31/2021 3.5  cm^2 Final    LVOT area(traced) 08/31/2021 3.5  cm^2 Final    LAd major 08/31/2021 5.0  cm Final    LVLd ap4 08/31/2021 10.0  cm Final    EDV(MOD-sp4) 08/31/2021 133.0   ml Final    LVLs ap4 08/31/2021 8.1  cm Final    ESV(MOD-sp4) 08/31/2021 51.0  ml Final    EF(MOD-sp4) 08/31/2021 61.7  % Final    LA ESV (BP) 08/31/2021 39.0  ml Final    SV(MOD-sp4) 08/31/2021 82.0  ml Final    SVi(MOD-SP4) 08/31/2021 35.8  ml/m^2 Final    Ao root area (BSA corrected) 08/31/2021 0.83   Final    LV Mcqueen Vol (BSA corrected) 08/31/2021 58.1  ml/m^2 Final    LV Sys Vol (BSA corrected) 08/31/2021 22.3  ml/m^2 Final    LA ESV Index (BP) 08/31/2021 17.0  ml/m^2 Final    MV E max letitia 08/31/2021 58.4  cm/sec Final    MV A max letitia 08/31/2021 50.9  cm/sec Final    MV E/A 08/31/2021 1.1   Final    MV V2 max 08/31/2021 68.9  cm/sec Final    MV max PG 08/31/2021 1.9  mmHg Final    MV V2 mean 08/31/2021 40.0  cm/sec Final    MV mean PG 08/31/2021 1.0  mmHg Final    MV V2 VTI 08/31/2021 19.5  cm Final    MVA(VTI) 08/31/2021 4.2  cm^2 Final    MV P1/2t max letitia 08/31/2021 59.8  cm/sec Final    MV P1/2t 08/31/2021 88.7  msec Final    MVA(P1/2t) 08/31/2021 2.5  cm^2 Final    MV dec slope 08/31/2021 197.5  cm/sec^2 Final    MV dec time 08/31/2021 0.28  sec Final    Ao pk letitia 08/31/2021 124.0  cm/sec Final    Ao max PG 08/31/2021 6.0  mmHg Final    Ao max PG (full) 08/31/2021 -0.4  mmHg Final    Ao V2 mean 08/31/2021 81.6  cm/sec Final    Ao mean PG 08/31/2021 3.0  mmHg Final    Ao mean PG (full) 08/31/2021 1.0  mmHg Final    Ao V2 VTI 08/31/2021 23.4  cm Final    OWEN(I,A) 08/31/2021 3.5  cm^2 Final    OWEN(I,D) 08/31/2021 3.5  cm^2 Final    OWEN(V,A) 08/31/2021 3.5  cm^2 Final    OWEN(V,D) 08/31/2021 3.5  cm^2 Final    LV V1 max PG 08/31/2021 6.4  mmHg Final    LV V1 mean PG 08/31/2021 2.0  mmHg Final    LV V1 max 08/31/2021 126.5  cm/sec Final    LV V1 mean 08/31/2021 66.6  cm/sec Final    LV V1 VTI 08/31/2021 23.9  cm Final    SV(Ao) 08/31/2021 66.3  ml Final    SI(Ao) 08/31/2021 29.0  ml/m^2 Final    SV(LVOT) 08/31/2021 82.8  ml Final    SI(LVOT) 08/31/2021 36.1  ml/m^2 Final    TV V2 max 08/31/2021 60.5  cm/sec Final     TV max PG 08/31/2021 1.5  mmHg Final    PA V2 max 08/31/2021 74.3  cm/sec Final    PA max PG 08/31/2021 2.2  mmHg Final    TR max terry 08/31/2021 171.5  cm/sec Final    TR max PG 08/31/2021 12.0  mmHg Final    RVSP(TR) 08/31/2021 15  mmHg Final    RAP systole 08/31/2021 3  mmHg Final    MVA P1/2T LCG 08/31/2021 3.7  cm^2 Final    Lat E/e'  08/31/2021 4.5   Final    Med E/e' 08/31/2021 6.9   Final    Lat Peak E' Terry 08/31/2021 12.9  cm/sec Final    Med Peak E' Terry 08/31/2021 8.5  cm/sec Final    BH CV ECHO SOLA - BZI_BMI 08/31/2021 35.7  kilograms/m^2 Final    BH CV ECHO SOLA - BSA(HAYCOCK) 08/31/2021 2.4  m^2 Final    BH CV ECHO SOLA - BZI_METRIC_WEIGHT 08/31/2021 112.9  kg Final    BH CV ECHO SOLA - BZI_METRIC_HEIGHT 08/31/2021 177.8  cm Final    Avg E/e' ratio 08/31/2021 5.46   Final    Echo EF Estimated 08/31/2021 65  % Final      No radiology results for the last 90 days.    Assessment / Plan      Assessment & Plan:  Diagnoses and all orders for this visit:    1. History of colon polyps (Primary)  -     Case Request; Standing  -     Case Request    2. Family history of colon cancer    Other orders  -     Follow Anesthesia Guidelines / Protocol; Standing  -     Follow Anesthesia Guidelines / Protocol; Future  -     Obtain Informed Consent; Future  -     Verify NPO; Standing        Plan for colonoscopy with monitored anesthesia care. Counseled in detail regarding the risks, benefits and alternatives of the procedure, including but not limited to perforation, bleeding, infection, post-operative pain, complications from anesthesia, aspiration, cardiac decompensation, need for further procedures or surgery, which may occur in approximately 1 in 1000 procedures. Counseled also that endoscopy and colonoscopy are not perfect tests, and there is a possibility of missed diagnoses, including but not limited to polyps or cancer. Counseled regarding pre procedural instructions. Also discussed bowel preparation. Counseled  regarding potential, albeit rare complications with bowel preparation specific to this patients medical history and medications, including but not limited to renal insufficiency/failure, electrolyte abnormalities, which may lead to other complications. Hydration is encouraged. Written instructions provided. Instructed to arrange for a ride home for the day of procedure. Patient is in agreement with the above plan and voices understanding of the plan as well as the associated risks and the alternative evaluation/treatment/medication options.       Follow Up:   Return if symptoms worsen or fail to improve.    Leonid Hernandez MD  G[facility]enter[facility] [place]    4/26/2024  09:18 EDT    Part of this note may be an electronic transcription/translation of spoken language to printed text using the Dragon Dictation System.

## 2024-04-26 NOTE — PROGRESS NOTES
"     New Patient Consult      Date: 2024   Patient Name: Chucho Castro  MRN: [id number]  : 1955     Referring Physician: Tamera Joy MD    Chief Complaint   Patient presents with    Colon Cancer Screening       History of Present Illness: Chucho Castro is a 69 y.o. male who is here today to establish care with Gastroenterology.    Had a colonoscopy in 2019. Had a TA on that exam.    Subjective      Past Medical History:   Diagnosis Date    Chest pain     12 years ago.  Dec 2018 states had \"pinching\" in chest.  states was evaluated at primary care and saw Dr. Toribio.  Pt states was not heart related issue.     Disease of thyroid gland     Elevated cholesterol     History of nuclear stress test     \"about 12 years ago\"    History of transesophageal echocardiography (IGNACIA)     \"about 12 years ago when I had the stress test\"    Hx of exercise stress test 2019    Dr. Catarino Mckeon        Past Surgical History:   Procedure Laterality Date    BONE GRAFT Right 2021    Jaw    COLONOSCOPY      COLONOSCOPY N/A 2019    Procedure: COLONOSCOPY with hot snare polypectomy x 1, cold biopsy polypectom x 2, resolution clip placement x1;  Surgeon: Alessandro Feliciano MD;  Location: [facility] ENDOSCOPY;  Service: Gastroenterology    HAND SURGERY Right     due to fracture, pins placed, and later removed    SKIN LESION EXCISION      face    VASECTOMY         Family History   Problem Relation Age of Onset    Liver cancer Brother     Liver disease Brother     Colon cancer Neg Hx     Cirrhosis Neg Hx     Crohn's disease Neg Hx     Ulcerative colitis Neg Hx     Esophageal cancer Neg Hx     Stomach cancer Neg Hx        Social History     Socioeconomic History    Marital status:    Tobacco Use    Smoking status: Never    Smokeless tobacco: Never   Substance and Sexual Activity    Alcohol use: No     Comment: no use x 25 years.      Drug use: No    Sexual activity: Defer         Current Outpatient " Medications:     ASPIRIN 81 PO, Take 1 tablet by mouth Daily., Disp: , Rfl:     cyanocobalamin (VITAMIN B-12) 1000 MCG tablet, cyanocobalamin (vit B-12) 1,000 mcg tablet  Take 1 tablet every day by oral route., Disp: , Rfl:     dorzolamide (TRUSOPT) 2 % ophthalmic solution, 1 drop 3 (Three) Times a Day., Disp: , Rfl:     latanoprost (XALATAN) 0.005 % ophthalmic solution, 1 drop Every Night., Disp: , Rfl:     levothyroxine (SYNTHROID, LEVOTHROID) 175 MCG tablet, Take 1 tablet by mouth Daily., Disp: , Rfl:     Multiple Vitamin (MULTI VITAMIN PO), Take 1 tablet by mouth Daily., Disp: , Rfl:     Omega-3 Fatty Acids (FISH OIL PO), Take 1 capsule by mouth Daily., Disp: , Rfl:     No Known Allergies    Review of Systems   Constitutional:  Negative for unexpected weight loss.   HENT:  Negative for trouble swallowing.    Gastrointestinal:  Negative for abdominal distention, abdominal pain, anal bleeding, blood in stool, constipation, diarrhea, nausea, rectal pain, vomiting, GERD and indigestion.       The following portions of the patient's history were reviewed and updated as appropriate: allergies, current medications, past family history, past medical history, past social history, past surgical history and problem list.    Objective     Physical Exam:  Vitals:    04/26/24 0902   BP: 118/80   Pulse: 67   SpO2: 97%   Weight: 109 kg (241 lb)       Physical Exam  Constitutional:       General: He is not in acute distress.     Appearance: Normal appearance.   HENT:      Head: Normocephalic and atraumatic.      Nose: Nose normal.      Mouth/Throat:      Mouth: Mucous membranes are moist.   Eyes:      General: No scleral icterus.     Conjunctiva/sclera: Conjunctivae normal.   Cardiovascular:      Rate and Rhythm: Normal rate.   Pulmonary:      Effort: Pulmonary effort is normal. No respiratory distress.   Abdominal:      General: There is no distension.      Tenderness: There is no abdominal tenderness. There is no guarding.    Musculoskeletal:         General: No deformity or signs of injury.      Cervical back: Neck supple. No rigidity.   Skin:     Capillary Refill: Capillary refill takes less than 2 seconds.      Coloration: Skin is not jaundiced or pale.   Neurological:      General: No focal deficit present.      Mental Status: He is alert and oriented to person, place, and time.   Psychiatric:         Mood and Affect: Mood normal.         Behavior: Behavior normal.         Results Review:   I have reviewed the patient's new clinical and imaging results and agree with the interpretation.     No visits with results within 90 Day(s) from this visit.   Latest known visit with results is:   Office Visit on 08/13/2021   Component Date Value Ref Range Status    BH CV STRESS PROTOCOL 1 08/31/2021 Edwin   Final    Stage 1 08/31/2021 1   Final    HR Stage 1 08/31/2021 97   Final    BP Stage 1 08/31/2021 140/96   Final    O2 Stage 1 08/31/2021 93   Final    Duration Min Stage 1 08/31/2021 3   Final    Duration Sec Stage 1 08/31/2021 0   Final    Grade Stage 1 08/31/2021 10   Final    Speed Stage 1 08/31/2021 1.7   Final    BH CV STRESS METS STAGE 1 08/31/2021 5   Final    Stage 2 08/31/2021 2   Final    HR Stage 2 08/31/2021 124   Final    BP Stage 2 08/31/2021 148/92   Final    O2 Stage 2 08/31/2021 93   Final    Duration Min Stage 2 08/31/2021 3   Final    Duration Sec Stage 2 08/31/2021 0   Final    Grade Stage 2 08/31/2021 12   Final    Speed Stage 2 08/31/2021 2.5   Final    BH CV STRESS METS STAGE 2 08/31/2021 7.5   Final    Stage 3 08/31/2021 3   Final    HR Stage 3 08/31/2021 134   Final    O2 Stage 3 08/31/2021 96   Final    Duration Min Stage 3 08/31/2021 3   Final    Duration Sec Stage 3 08/31/2021 0   Final    Grade Stage 3 08/31/2021 14   Final    Speed Stage 3 08/31/2021 3.4   Final    BH CV STRESS METS STAGE 3 08/31/2021 10.0   Final    Baseline HR 08/31/2021 72  bpm Final    Baseline BP 08/31/2021 146/96  mmHg Final    O2 sat  rest 08/31/2021 97  % Final    Peak HR 08/31/2021 135  bpm Final    Percent Max Pred HR 08/31/2021 87.10  % Final    Percent Target HR 08/31/2021 102  % Final    Peak BP 08/31/2021 148/92  mmHg Final    O2 sat peak 08/31/2021 93  % Final    Recovery HR 08/31/2021 71  bpm Final    Recovery BP 08/31/2021 138/90  mmHg Final    Recovery O2 08/31/2021 96  % Final    Target HR (85%) 08/31/2021 132  bpm Final    Max. Pred. HR (100%) 08/31/2021 155  bpm Final    Exercise duration (min) 08/31/2021 7  min Final    Estimated workload 08/31/2021 10.1  METS Final    BSA 08/31/2021 2.3  m^2 Final    IVSd 08/31/2021 1.4  cm Final    IVSs 08/31/2021 1.8  cm Final    LVIDd 08/31/2021 5.4  cm Final    LVIDs 08/31/2021 3.4  cm Final    LVPWd 08/31/2021 1.2  cm Final    BH CV ECHO SOLA - LVPWS 08/31/2021 1.6  cm Final    IVS/LVPW 08/31/2021 1.2   Final    FS 08/31/2021 36.4  % Final    EDV(Teich) 08/31/2021 138.3  ml Final    ESV(Teich) 08/31/2021 47.4  ml Final    EF(Teich) 08/31/2021 65.7  % Final    EDV(cubed) 08/31/2021 153.1  ml Final    ESV(cubed) 08/31/2021 39.3  ml Final    EF(cubed) 08/31/2021 74.3  % Final    % IVS thick 08/31/2021 25.0  % Final    % LVPW thick 08/31/2021 34.8  % Final    LV mass(C)d 08/31/2021 283.4  grams Final    LV mass(C)dI 08/31/2021 123.7  grams/m^2 Final    LV mass(C)s 08/31/2021 218.1  grams Final    LV mass(C)sI 08/31/2021 95.2  grams/m^2 Final    SV(Teich) 08/31/2021 90.9  ml Final    SI(Teich) 08/31/2021 39.7  ml/m^2 Final    SV(cubed) 08/31/2021 113.8  ml Final    SI(cubed) 08/31/2021 49.7  ml/m^2 Final    Ao root diam 08/31/2021 1.9  cm Final    Ao root area 08/31/2021 2.8  cm^2 Final    LA dimension (2D)  08/31/2021 3.9  cm Final    LA/Ao 08/31/2021 2.1   Final    LVOT diam 08/31/2021 2.1  cm Final    LVOT area 08/31/2021 3.5  cm^2 Final    LVOT area(traced) 08/31/2021 3.5  cm^2 Final    LAd major 08/31/2021 5.0  cm Final    LVLd ap4 08/31/2021 10.0  cm Final    EDV(MOD-sp4) 08/31/2021 133.0   ml Final    LVLs ap4 08/31/2021 8.1  cm Final    ESV(MOD-sp4) 08/31/2021 51.0  ml Final    EF(MOD-sp4) 08/31/2021 61.7  % Final    LA ESV (BP) 08/31/2021 39.0  ml Final    SV(MOD-sp4) 08/31/2021 82.0  ml Final    SVi(MOD-SP4) 08/31/2021 35.8  ml/m^2 Final    Ao root area (BSA corrected) 08/31/2021 0.83   Final    LV Mcqueen Vol (BSA corrected) 08/31/2021 58.1  ml/m^2 Final    LV Sys Vol (BSA corrected) 08/31/2021 22.3  ml/m^2 Final    LA ESV Index (BP) 08/31/2021 17.0  ml/m^2 Final    MV E max letitia 08/31/2021 58.4  cm/sec Final    MV A max letitia 08/31/2021 50.9  cm/sec Final    MV E/A 08/31/2021 1.1   Final    MV V2 max 08/31/2021 68.9  cm/sec Final    MV max PG 08/31/2021 1.9  mmHg Final    MV V2 mean 08/31/2021 40.0  cm/sec Final    MV mean PG 08/31/2021 1.0  mmHg Final    MV V2 VTI 08/31/2021 19.5  cm Final    MVA(VTI) 08/31/2021 4.2  cm^2 Final    MV P1/2t max letitia 08/31/2021 59.8  cm/sec Final    MV P1/2t 08/31/2021 88.7  msec Final    MVA(P1/2t) 08/31/2021 2.5  cm^2 Final    MV dec slope 08/31/2021 197.5  cm/sec^2 Final    MV dec time 08/31/2021 0.28  sec Final    Ao pk letitia 08/31/2021 124.0  cm/sec Final    Ao max PG 08/31/2021 6.0  mmHg Final    Ao max PG (full) 08/31/2021 -0.4  mmHg Final    Ao V2 mean 08/31/2021 81.6  cm/sec Final    Ao mean PG 08/31/2021 3.0  mmHg Final    Ao mean PG (full) 08/31/2021 1.0  mmHg Final    Ao V2 VTI 08/31/2021 23.4  cm Final    OWEN(I,A) 08/31/2021 3.5  cm^2 Final    OWEN(I,D) 08/31/2021 3.5  cm^2 Final    OWEN(V,A) 08/31/2021 3.5  cm^2 Final    OWEN(V,D) 08/31/2021 3.5  cm^2 Final    LV V1 max PG 08/31/2021 6.4  mmHg Final    LV V1 mean PG 08/31/2021 2.0  mmHg Final    LV V1 max 08/31/2021 126.5  cm/sec Final    LV V1 mean 08/31/2021 66.6  cm/sec Final    LV V1 VTI 08/31/2021 23.9  cm Final    SV(Ao) 08/31/2021 66.3  ml Final    SI(Ao) 08/31/2021 29.0  ml/m^2 Final    SV(LVOT) 08/31/2021 82.8  ml Final    SI(LVOT) 08/31/2021 36.1  ml/m^2 Final    TV V2 max 08/31/2021 60.5  cm/sec Final     TV max PG 08/31/2021 1.5  mmHg Final    PA V2 max 08/31/2021 74.3  cm/sec Final    PA max PG 08/31/2021 2.2  mmHg Final    TR max terry 08/31/2021 171.5  cm/sec Final    TR max PG 08/31/2021 12.0  mmHg Final    RVSP(TR) 08/31/2021 15  mmHg Final    RAP systole 08/31/2021 3  mmHg Final    MVA P1/2T LCG 08/31/2021 3.7  cm^2 Final    Lat E/e'  08/31/2021 4.5   Final    Med E/e' 08/31/2021 6.9   Final    Lat Peak E' Terry 08/31/2021 12.9  cm/sec Final    Med Peak E' Terry 08/31/2021 8.5  cm/sec Final    BH CV ECHO SOLA - BZI_BMI 08/31/2021 35.7  kilograms/m^2 Final    BH CV ECHO SOLA - BSA(HAYCOCK) 08/31/2021 2.4  m^2 Final    BH CV ECHO SOLA - BZI_METRIC_WEIGHT 08/31/2021 112.9  kg Final    BH CV ECHO SOLA - BZI_METRIC_HEIGHT 08/31/2021 177.8  cm Final    Avg E/e' ratio 08/31/2021 5.46   Final    Echo EF Estimated 08/31/2021 65  % Final      No radiology results for the last 90 days.    Assessment / Plan      Assessment & Plan:  Diagnoses and all orders for this visit:    1. History of colon polyps (Primary)  -     Case Request; Standing  -     Case Request    2. Family history of colon cancer    Other orders  -     Follow Anesthesia Guidelines / Protocol; Standing  -     Follow Anesthesia Guidelines / Protocol; Future  -     Obtain Informed Consent; Future  -     Verify NPO; Standing        Plan for colonoscopy with monitored anesthesia care. Counseled in detail regarding the risks, benefits and alternatives of the procedure, including but not limited to perforation, bleeding, infection, post-operative pain, complications from anesthesia, aspiration, cardiac decompensation, need for further procedures or surgery, which may occur in approximately 1 in 1000 procedures. Counseled also that endoscopy and colonoscopy are not perfect tests, and there is a possibility of missed diagnoses, including but not limited to polyps or cancer. Counseled regarding pre procedural instructions. Also discussed bowel preparation. Counseled  regarding potential, albeit rare complications with bowel preparation specific to this patients medical history and medications, including but not limited to renal insufficiency/failure, electrolyte abnormalities, which may lead to other complications. Hydration is encouraged. Written instructions provided. Instructed to arrange for a ride home for the day of procedure. Patient is in agreement with the above plan and voices understanding of the plan as well as the associated risks and the alternative evaluation/treatment/medication options.       Follow Up:   Return if symptoms worsen or fail to improve.    Leonid Hernandez MD  [facility]    4/26/2024  09:18 EDT    Part of this note may be an electronic transcription/translation of spoken language to printed text using the Dragon Dictation System.

## 2024-04-30 PROBLEM — Z86.010 HISTORY OF COLON POLYPS: Status: ACTIVE | Noted: 2024-04-26

## 2024-05-21 ENCOUNTER — HOSPITAL ENCOUNTER (OUTPATIENT)
Facility: HOSPITAL | Age: 69
Setting detail: HOSPITAL OUTPATIENT SURGERY
Discharge: HOME | End: 2024-05-21
Attending: INTERNAL MEDICINE | Admitting: INTERNAL MEDICINE
Payer: COMMERCIAL

## 2024-05-21 ENCOUNTER — ANESTHESIA (OUTPATIENT)
Dept: GASTROENTEROLOGY | Facility: HOSPITAL | Age: 69
End: 2024-05-21
Payer: COMMERCIAL

## 2024-05-21 ENCOUNTER — ANESTHESIA EVENT (OUTPATIENT)
Dept: GASTROENTEROLOGY | Facility: HOSPITAL | Age: 69
End: 2024-05-21
Payer: COMMERCIAL

## 2024-05-21 VITALS
SYSTOLIC BLOOD PRESSURE: 121 MMHG | HEART RATE: 58 BPM | OXYGEN SATURATION: 95 % | BODY MASS INDEX: 32.93 KG/M2 | HEIGHT: 70 IN | WEIGHT: 230 LBS | RESPIRATION RATE: 14 BRPM | DIASTOLIC BLOOD PRESSURE: 75 MMHG | TEMPERATURE: 97.4 F

## 2024-05-21 PROCEDURE — 25810000003 LACTATED RINGERS PER 1000 ML: Performed by: INTERNAL MEDICINE

## 2024-05-21 PROCEDURE — 25010000002 PROPOFOL 10 MG/ML EMULSION: Performed by: NURSE ANESTHETIST, CERTIFIED REGISTERED

## 2024-05-21 RX ORDER — SODIUM CHLORIDE, SODIUM LACTATE, POTASSIUM CHLORIDE, CALCIUM CHLORIDE 600; 310; 30; 20 MG/100ML; MG/100ML; MG/100ML; MG/100ML
1000 INJECTION, SOLUTION INTRAVENOUS CONTINUOUS
Status: DISCONTINUED | OUTPATIENT
Start: 2024-05-21 | End: 2024-05-21 | Stop reason: HOSPADM

## 2024-05-21 RX ORDER — PROPOFOL 10 MG/ML
VIAL (ML) INTRAVENOUS AS NEEDED
Status: DISCONTINUED | OUTPATIENT
Start: 2024-05-21 | End: 2024-05-21 | Stop reason: SURG

## 2024-05-21 RX ORDER — ONDANSETRON 2 MG/ML
4 INJECTION INTRAMUSCULAR; INTRAVENOUS ONCE AS NEEDED
Status: CANCELLED | OUTPATIENT
Start: 2024-05-21 | End: 2024-05-21

## 2024-05-21 RX ORDER — SODIUM CHLORIDE 0.9 % (FLUSH) 0.9 %
10 SYRINGE (ML) INJECTION AS NEEDED
Status: DISCONTINUED | OUTPATIENT
Start: 2024-05-21 | End: 2024-05-21 | Stop reason: HOSPADM

## 2024-05-21 RX ADMIN — SODIUM CHLORIDE, POTASSIUM CHLORIDE, SODIUM LACTATE AND CALCIUM CHLORIDE 1000 ML: 600; 310; 30; 20 INJECTION, SOLUTION INTRAVENOUS at 12:09

## 2024-05-21 RX ADMIN — LIDOCAINE HYDROCHLORIDE 100 MG: 20 INJECTION, SOLUTION INTRAVENOUS at 12:58

## 2024-05-21 RX ADMIN — PROPOFOL 400 MG: 10 INJECTION, EMULSION INTRAVENOUS at 12:58

## 2024-05-21 NOTE — ANESTHESIA PREPROCEDURE EVALUATION
Anesthesia Evaluation     Patient summary reviewed and Nursing notes reviewed   NPO Solid Status: > 8 hours  NPO Liquid Status: > 8 hours           Airway   Mallampati: I  TM distance: >3 FB  Neck ROM: full  No difficulty expected  Dental - normal exam   (+) poor dentition    Pulmonary - negative pulmonary ROS and normal exam   Cardiovascular - normal exam    Rhythm: regular  Rate: normal    (+) [facility], hyperlipidemia      Neuro/Psych- negative ROS  GI/Hepatic/Renal/Endo    (+) obesity, thyroid problem hypothyroidism    Musculoskeletal (-) negative ROS    Abdominal  - normal exam    Bowel sounds: normal.   Substance History - negative use     OB/GYN negative ob/gyn ROS         Other                      Anesthesia Plan    ASA 2     MAC     intravenous induction     Anesthetic plan, risks, benefits, and alternatives have been provided, discussed and informed consent has been obtained with: patient.    Plan discussed with attending.

## 2024-05-21 NOTE — DISCHARGE INSTRUCTIONS
No pushing, pulling, tugging,  heavy lifting, or strenuous activity.  No major decision making, driving, or drinking alcoholic beverages for 24 hours. ( due to the medications you have  received)  Always use good hand hygiene/washing techniques.  NO driving while taking pain medications.  To assist you in voiding:    Drink plenty of fluids  Listen to running water while attempting to void.    If you are unable to urinate and you have an uncomfortable urge to void or it has been   6 hours since you were discharged, return to the Emergency Room

## 2024-05-21 NOTE — ANESTHESIA POSTPROCEDURE EVALUATION
Patient: Chucho Castro    Procedure Summary       Date: 05/21/24 Room / Location: [facility] ENDOSCOPY 2 / [facility] ENDOSCOPY    Anesthesia Start: 1255 Anesthesia Stop: 1322    Procedure: COLONOSCOPY (Anus) Diagnosis:       History of colon polyps      (History of colon polyps [Z86.010])    Surgeons: Leonid Hernandez MD Provider: Mitul Durant CRNA    Anesthesia Type: MAC ASA Status: 2            Anesthesia Type: MAC    Vitals  Vitals Value Taken Time   /75 05/21/24 1335   Temp 97.4 °F (36.3 °C) 05/21/24 1321   Pulse 58 05/21/24 1335   Resp 14 05/21/24 1335   SpO2 95 % 05/21/24 1335           Post Anesthesia Care and Evaluation    Patient location during evaluation: PHASE II  Patient participation: complete - patient participated  Level of consciousness: awake  Pain score: 1  Pain management: adequate    Airway patency: patent  Anesthetic complications: No anesthetic complications  PONV Status: controlled  Cardiovascular status: acceptable and stable  Respiratory status: acceptable  Hydration status: acceptable    Comments: See Nursing record for post procedural Vital Signs as per protocol.

## (undated) DEVICE — HYBRID CO2 TUBING/CAP SET FOR OLYMPUS® SCOPES & CO2 SOURCE: Brand: ERBE

## (undated) DEVICE — Device: Brand: DEFENDO AIR/WATER/SUCTION AND BIOPSY VALVE

## (undated) DEVICE — Device

## (undated) DEVICE — JELLY,LUBE,STERILE,FLIP TOP,TUBE,2-OZ: Brand: MEDLINE

## (undated) DEVICE — ENDOGATOR AUXILIARY WATER JET CONNECTOR: Brand: ENDOGATOR

## (undated) DEVICE — ENDOSCOPY PORT CONNECTOR FOR OLYMPUS® SCOPES: Brand: ERBE

## (undated) DEVICE — QUICK CATCH IN-LINE SUCTION POLYP TRAP IS USED FOR SUCTION RETRIEVAL OF ENDOSCOPICALLY REMOVED POLYPS.

## (undated) DEVICE — VLV SXN AIR/H2O ORCAPOD3 1P/U STRL

## (undated) DEVICE — PAD GRND REM POLYHESIVE A/ DISP

## (undated) DEVICE — LUBE JELLY PK/2.75GM STRL BX/144

## (undated) DEVICE — SNAR POLYP SENSATION STDOVL 27 240 BX40